# Patient Record
Sex: MALE | Race: WHITE | NOT HISPANIC OR LATINO | Employment: FULL TIME | URBAN - NONMETROPOLITAN AREA
[De-identification: names, ages, dates, MRNs, and addresses within clinical notes are randomized per-mention and may not be internally consistent; named-entity substitution may affect disease eponyms.]

---

## 2023-12-03 ENCOUNTER — HOSPITAL ENCOUNTER (EMERGENCY)
Facility: HOSPITAL | Age: 38
Discharge: HOME | End: 2023-12-03
Attending: EMERGENCY MEDICINE
Payer: COMMERCIAL

## 2023-12-03 VITALS
HEIGHT: 71 IN | DIASTOLIC BLOOD PRESSURE: 70 MMHG | OXYGEN SATURATION: 96 % | BODY MASS INDEX: 30.1 KG/M2 | SYSTOLIC BLOOD PRESSURE: 134 MMHG | RESPIRATION RATE: 18 BRPM | WEIGHT: 215 LBS | HEART RATE: 96 BPM | TEMPERATURE: 98.3 F

## 2023-12-03 DIAGNOSIS — J06.9 UPPER RESPIRATORY INFECTION WITH COUGH AND CONGESTION: Primary | ICD-10-CM

## 2023-12-03 DIAGNOSIS — H66.002 ACUTE SUPPURATIVE OTITIS MEDIA OF LEFT EAR WITHOUT SPONTANEOUS RUPTURE OF TYMPANIC MEMBRANE, RECURRENCE NOT SPECIFIED: ICD-10-CM

## 2023-12-03 PROCEDURE — 2500000001 HC RX 250 WO HCPCS SELF ADMINISTERED DRUGS (ALT 637 FOR MEDICARE OP): Performed by: EMERGENCY MEDICINE

## 2023-12-03 PROCEDURE — 99283 EMERGENCY DEPT VISIT LOW MDM: CPT | Performed by: EMERGENCY MEDICINE

## 2023-12-03 RX ORDER — AMOXICILLIN 250 MG/1
500 CAPSULE ORAL EVERY 8 HOURS SCHEDULED
Status: DISCONTINUED | OUTPATIENT
Start: 2023-12-03 | End: 2023-12-03 | Stop reason: HOSPADM

## 2023-12-03 RX ORDER — NAPROXEN 375 MG/1
375 TABLET ORAL
Qty: 20 TABLET | Refills: 0 | Status: SHIPPED | OUTPATIENT
Start: 2023-12-03 | End: 2023-12-13

## 2023-12-03 RX ORDER — NAPROXEN 250 MG/1
500 TABLET ORAL ONCE
Status: COMPLETED | OUTPATIENT
Start: 2023-12-03 | End: 2023-12-03

## 2023-12-03 RX ORDER — AMOXICILLIN 500 MG/1
500 CAPSULE ORAL 3 TIMES DAILY
Qty: 30 CAPSULE | Refills: 0 | Status: SHIPPED | OUTPATIENT
Start: 2023-12-03 | End: 2023-12-13

## 2023-12-03 RX ADMIN — AMOXICILLIN 500 MG: 250 CAPSULE ORAL at 21:04

## 2023-12-03 RX ADMIN — NAPROXEN 500 MG: 250 TABLET ORAL at 21:05

## 2023-12-03 ASSESSMENT — COLUMBIA-SUICIDE SEVERITY RATING SCALE - C-SSRS
6. HAVE YOU EVER DONE ANYTHING, STARTED TO DO ANYTHING, OR PREPARED TO DO ANYTHING TO END YOUR LIFE?: NO
1. IN THE PAST MONTH, HAVE YOU WISHED YOU WERE DEAD OR WISHED YOU COULD GO TO SLEEP AND NOT WAKE UP?: NO
2. HAVE YOU ACTUALLY HAD ANY THOUGHTS OF KILLING YOURSELF?: NO

## 2023-12-03 ASSESSMENT — PAIN - FUNCTIONAL ASSESSMENT: PAIN_FUNCTIONAL_ASSESSMENT: 0-10

## 2023-12-03 ASSESSMENT — PAIN DESCRIPTION - PROGRESSION: CLINICAL_PROGRESSION: NOT CHANGED

## 2023-12-03 ASSESSMENT — PAIN SCALES - GENERAL: PAINLEVEL_OUTOF10: 1

## 2023-12-04 NOTE — DISCHARGE INSTRUCTIONS
use a vaporizer or cool mist  humidifier.    Tylenol as needed for fever or pain.    Return to the emergency department for uncontrolled fever, shortness of breath, coughing up blood, vomiting, swelling behind ear, rash.     Follow up with your primary care doctor in the next 3-5 days.    If you do  not have a doctor, you can call the referral line to get one.

## 2023-12-04 NOTE — ED PROVIDER NOTES
HPI   Chief Complaint   Patient presents with    Sinusitis     Sinus pressure for a few days, sinus headache, no fevers         History provided by:  Patient   used: No         This patient presents to the emergency department ambulatory via private vehicle because he is concerned he might have a sinus infection.  Patient states symptoms started 2 days ago with nasal congestion and facial pressure.  He is now having pain in his ear and throat as well as a headache.  He has not run a fever.  Occasional cough that is nonproductive.  No chest pain or shortness of breath.  No known ill exposure.    Patient has no chronic health problems and is on no daily medications.  He did have a history of frequent otitis as a child, but nothing recently.  He has been taking Tylenol and Motrin for pain without significant improvement.               No data recorded                Patient History   History reviewed. No pertinent past medical history.  History reviewed. No pertinent surgical history.  No family history on file.  Social History     Tobacco Use    Smoking status: Never    Smokeless tobacco: Never   Substance Use Topics    Alcohol use: Not Currently    Drug use: Never       Physical Exam   ED Triage Vitals [12/03/23 2042]   Temp Heart Rate Resp BP   36.8 °C (98.3 °F) 102 18 131/90      SpO2 Temp src Heart Rate Source Patient Position   97 % -- -- --      BP Location FiO2 (%)     -- --       Physical Exam  Vitals reviewed.   Constitutional:       Appearance: Normal appearance.   HENT:      Head: Normocephalic and atraumatic.      Right Ear: Tympanic membrane normal.      Ears:      Comments: Redness, dullness, effusion     Nose: Congestion present.      Mouth/Throat:      Mouth: Mucous membranes are moist.      Pharynx: No oropharyngeal exudate or posterior oropharyngeal erythema.   Eyes:      Extraocular Movements: Extraocular movements intact.      Conjunctiva/sclera: Conjunctivae normal.       Pupils: Pupils are equal, round, and reactive to light.   Cardiovascular:      Rate and Rhythm: Normal rate and regular rhythm.      Pulses: Normal pulses.      Heart sounds: Normal heart sounds.   Pulmonary:      Effort: Pulmonary effort is normal.      Breath sounds: Normal breath sounds.   Musculoskeletal:         General: Normal range of motion.      Cervical back: Normal range of motion and neck supple. No rigidity or tenderness.   Lymphadenopathy:      Cervical: No cervical adenopathy.   Skin:     General: Skin is warm and dry.      Capillary Refill: Capillary refill takes less than 2 seconds.   Neurological:      General: No focal deficit present.      Mental Status: He is alert and oriented to person, place, and time.      Gait: Gait normal.   Psychiatric:         Mood and Affect: Mood normal.         ED Course & MDM   Diagnoses as of 12/03/23 2053   Upper respiratory infection with cough and congestion   Acute suppurative otitis media of left ear without spontaneous rupture of tympanic membrane, recurrence not specified       Medical Decision Making  This patient presents emergency department the above history and physical.  No signs of sepsis, dehydration, pneumonia, mastoiditis, tympanic membrane perforation, meningitis.  Obvious clinical otitis media on exam.  Amoxicillin prescribed with first dose given in the emergency department.  Prescription also for naproxen to help with pain as he is not getting relief from over-the-counter medications.  Work note provided at patient request.    Results of exam and any testing were discussed with patient/family. To the best of my ability, I answered all questions. At this time, there is no indication for admission/transfer or further diagnostic testing. Patient understands to return for any new or worsening symptoms, or failure to improve as anticipated. The importance of follow-up was stressed.    Procedure  Procedures     Cecy Pepper MD  12/04/23 0039

## 2024-01-22 ENCOUNTER — APPOINTMENT (OUTPATIENT)
Dept: PRIMARY CARE | Facility: CLINIC | Age: 39
End: 2024-01-22
Payer: COMMERCIAL

## 2025-02-01 ENCOUNTER — HOSPITAL ENCOUNTER (EMERGENCY)
Facility: HOSPITAL | Age: 40
Discharge: HOME | End: 2025-02-01
Attending: EMERGENCY MEDICINE
Payer: COMMERCIAL

## 2025-02-01 VITALS
WEIGHT: 225 LBS | TEMPERATURE: 97.3 F | BODY MASS INDEX: 31.5 KG/M2 | HEART RATE: 94 BPM | DIASTOLIC BLOOD PRESSURE: 95 MMHG | RESPIRATION RATE: 15 BRPM | OXYGEN SATURATION: 99 % | HEIGHT: 71 IN | SYSTOLIC BLOOD PRESSURE: 133 MMHG

## 2025-02-01 DIAGNOSIS — K04.7 DENTAL ABSCESS: Primary | ICD-10-CM

## 2025-02-01 PROCEDURE — 2500000001 HC RX 250 WO HCPCS SELF ADMINISTERED DRUGS (ALT 637 FOR MEDICARE OP)

## 2025-02-01 PROCEDURE — 99283 EMERGENCY DEPT VISIT LOW MDM: CPT | Performed by: EMERGENCY MEDICINE

## 2025-02-01 RX ORDER — AMOXICILLIN 250 MG/1
CAPSULE ORAL
Status: COMPLETED
Start: 2025-02-01 | End: 2025-02-01

## 2025-02-01 RX ORDER — AMOXICILLIN 500 MG/1
500 CAPSULE ORAL 3 TIMES DAILY
Qty: 21 CAPSULE | Refills: 0 | Status: SHIPPED | OUTPATIENT
Start: 2025-02-01 | End: 2025-02-08

## 2025-02-01 RX ORDER — AMOXICILLIN 250 MG/1
500 CAPSULE ORAL EVERY 8 HOURS SCHEDULED
Status: DISCONTINUED | OUTPATIENT
Start: 2025-02-01 | End: 2025-02-01 | Stop reason: HOSPADM

## 2025-02-01 RX ADMIN — AMOXICILLIN 500 MG: 250 CAPSULE ORAL at 18:18

## 2025-02-01 ASSESSMENT — COLUMBIA-SUICIDE SEVERITY RATING SCALE - C-SSRS
1. IN THE PAST MONTH, HAVE YOU WISHED YOU WERE DEAD OR WISHED YOU COULD GO TO SLEEP AND NOT WAKE UP?: NO
2. HAVE YOU ACTUALLY HAD ANY THOUGHTS OF KILLING YOURSELF?: NO
6. HAVE YOU EVER DONE ANYTHING, STARTED TO DO ANYTHING, OR PREPARED TO DO ANYTHING TO END YOUR LIFE?: NO

## 2025-02-01 ASSESSMENT — PAIN DESCRIPTION - DESCRIPTORS: DESCRIPTORS: ACHING

## 2025-02-01 ASSESSMENT — PAIN SCALES - GENERAL
PAINLEVEL_OUTOF10: 7
PAINLEVEL_OUTOF10: 7

## 2025-02-01 ASSESSMENT — PAIN DESCRIPTION - LOCATION: LOCATION: FACE

## 2025-02-01 ASSESSMENT — PAIN - FUNCTIONAL ASSESSMENT: PAIN_FUNCTIONAL_ASSESSMENT: 0-10

## 2025-02-01 ASSESSMENT — PAIN DESCRIPTION - ORIENTATION: ORIENTATION: LEFT

## 2025-02-01 ASSESSMENT — PAIN DESCRIPTION - PAIN TYPE: TYPE: ACUTE PAIN

## 2025-02-01 NOTE — ED PROVIDER NOTES
HPI   Chief Complaint   Patient presents with    Facial Pain       Patient presents with a chief complaint of left upper dental decay, pain, and swelling.  He has been taking ibuprofen  at this time without relief      ROS:    CONSTITUTIONAL: Denies weight loss, fever and chills    HEENT: Denies changes in vision and hearing, No sore throat.   left upper dental pain and swelling    RESPIRATORY: Denies SOB and cough    CV: Denies palpitations or chest pain    GI: Denies abdominal pain, nausea, vomiting and diarrhea    : Denies dysuria and urinary frequency    MUSCULOSKELETAL: Denies myalgia and joint pain    SKIN: Denies rash and pruritus    NEUROLOGICAL: Denies headache and syncope    PSYCHIATRIC: Denies recent changes in mood. Denies anxiety and depression       PHYSICAL EXAM:    GENERAL: Alert and oriented x 3. No acute distress. Well-nourished    EYES: EOMI. Anicteric    HEENT: Moist mucous membranes. No scleral icterus. No cervical lymphadenopathy     Mouth:   Left upper canine tooth with decay and swelling with tenderness to palpation    LUNGS: Clear to auscultation bilaterally. No accessory muscle use    CARDIOVASCULAR: Regular rate and rhythm. No murmur. No JVD    ABDOMEN: Soft, non-tender and non-distended. No palpable masses    EXTREMITIES: No edema. Non-tender    SKIN: No rashes or lesions    NEUROLOGIC: No focal neurological deficits. CN II-XII grossly intact    PSYCHIATRIC: Cooperative. Appropriate mood and affect       Medical Decision Making:    Differential Diagnosis:  dental decay    Clinical Laboratory Review:    Imaging Review:    Discussion with Consulting Physician:    Treatment Plan:  patient to be placed on amoxicillin at this time and continue with ibuprofen    Follow Up:  Follow up with your primary care physician as soon as possible    Return Precautions:  Return to the emergency department if symptoms worsen at any time               Patient History   History reviewed. No pertinent past  medical history.  History reviewed. No pertinent surgical history.  No family history on file.  Social History     Tobacco Use    Smoking status: Never    Smokeless tobacco: Never   Vaping Use    Vaping status: Every Day   Substance Use Topics    Alcohol use: Not Currently    Drug use: Never       Physical Exam   ED Triage Vitals [02/01/25 1810]   Temperature Heart Rate Respirations BP   36.3 °C (97.3 °F) 94 15 (!) 133/95      SpO2 Temp src Heart Rate Source Patient Position   99 % -- -- --      BP Location FiO2 (%)     -- --       Physical Exam      ED Course & MDM   Diagnoses as of 02/01/25 1817   Dental abscess                 No data recorded     [place] Coma Scale Score: 15 (02/01/25 1812 : Monet Stokes RN)                           Medical Decision Making      Procedure  Procedures    Diagnoses as of 02/01/25 1818   Dental abscess          Ute Rojas,   02/01/25 1818